# Patient Record
Sex: FEMALE | Race: WHITE | URBAN - METROPOLITAN AREA
[De-identification: names, ages, dates, MRNs, and addresses within clinical notes are randomized per-mention and may not be internally consistent; named-entity substitution may affect disease eponyms.]

---

## 2021-07-07 ENCOUNTER — OFFICE VISIT (OUTPATIENT)
Dept: URBAN - METROPOLITAN AREA CLINIC 18 | Facility: CLINIC | Age: 71
End: 2021-07-07
Payer: MEDICARE

## 2021-07-07 DIAGNOSIS — H52.4 PRESBYOPIA: ICD-10-CM

## 2021-07-07 DIAGNOSIS — H25.13 AGE-RELATED NUCLEAR CATARACT, BILATERAL: ICD-10-CM

## 2021-07-07 DIAGNOSIS — E11.9 DIABETES MELLITUS TYPE 2 WITHOUT MENTION OF COMPLICATION: Primary | ICD-10-CM

## 2021-07-07 PROCEDURE — 99203 OFFICE O/P NEW LOW 30 MIN: CPT | Performed by: OPTOMETRIST

## 2021-07-07 ASSESSMENT — VISUAL ACUITY
OS: 20/30
OD: 20/30

## 2021-07-07 ASSESSMENT — INTRAOCULAR PRESSURE
OS: 9
OD: 11

## 2021-07-07 NOTE — IMPRESSION/PLAN
Impression: Age-related nuclear cataract, bilateral: H25.13. Plan: Cataracts account for the patient's complaints. Patient education was discussed regarding cataracts.

## 2021-07-07 NOTE — IMPRESSION/PLAN
Impression: Presbyopia: H52.4. Plan: Finalized new glasses rx. Patient education on appropriate options of eye glasses. return to clinic in one year for complete eye exam and refraction.

## 2022-07-08 ENCOUNTER — OFFICE VISIT (OUTPATIENT)
Dept: URBAN - METROPOLITAN AREA CLINIC 18 | Facility: CLINIC | Age: 72
End: 2022-07-08
Payer: MEDICARE

## 2022-07-08 DIAGNOSIS — H52.4 PRESBYOPIA: ICD-10-CM

## 2022-07-08 DIAGNOSIS — E11.9 DIABETES MELLITUS TYPE 2 WITHOUT MENTION OF COMPLICATION: Primary | ICD-10-CM

## 2022-07-08 DIAGNOSIS — H25.13 AGE-RELATED NUCLEAR CATARACT, BILATERAL: ICD-10-CM

## 2022-07-08 PROCEDURE — 99213 OFFICE O/P EST LOW 20 MIN: CPT | Performed by: OPTOMETRIST

## 2022-07-08 ASSESSMENT — VISUAL ACUITY
OD: 20/25
OS: 20/20

## 2022-07-08 ASSESSMENT — INTRAOCULAR PRESSURE
OD: 11
OS: 13

## 2022-07-08 NOTE — IMPRESSION/PLAN
Impression: Presbyopia: H52.4. Plan: Finalized [employer]. Patient education on appropriate options of eye glasses. Return to clinic in one year for complete eye exam and refraction.

## 2022-07-08 NOTE — IMPRESSION/PLAN
Impression: Diabetes mellitus Type 2 without mention of complication: S45.7. Plan: Diabetes type II: no background retinopathy, no signs of neovascularization noted. Discussed ocular and systemic benefits of blood sugar control.  Return to clinic with Dr. Marlowe Paget in one year for a diabetic eye exam.

## 2023-07-10 ENCOUNTER — OFFICE VISIT (OUTPATIENT)
Dept: URBAN - METROPOLITAN AREA CLINIC 18 | Facility: CLINIC | Age: 73
End: 2023-07-10
Payer: MEDICARE

## 2023-07-10 DIAGNOSIS — E11.9 TYPE 2 DIABETES MELLITUS W/O COMPLICATION: Primary | ICD-10-CM

## 2023-07-10 DIAGNOSIS — H43.393 OTHER VITREOUS OPACITIES, BILATERAL: ICD-10-CM

## 2023-07-10 DIAGNOSIS — H25.13 AGE-RELATED NUCLEAR CATARACT, BILATERAL: ICD-10-CM

## 2023-07-10 DIAGNOSIS — H52.4 PRESBYOPIA: ICD-10-CM

## 2023-07-10 PROCEDURE — 99214 OFFICE O/P EST MOD 30 MIN: CPT | Performed by: OPTOMETRIST

## 2023-07-10 ASSESSMENT — VISUAL ACUITY
OD: 20/20
OS: 20/25-

## 2023-07-10 ASSESSMENT — INTRAOCULAR PRESSURE
OS: 9
OD: 11

## 2023-07-10 NOTE — IMPRESSION/PLAN
Impression: Other vitreous opacities, bilateral: H43.393. Plan:  There is no evidence of retinal pathology. All signs and risks of retinal detachment and tears were discussed in detail. Patient instructed to call the office immediately if flashes and floaters increase dramatically.

## 2023-07-10 NOTE — IMPRESSION/PLAN
Impression: Type 2 diabetes mellitus w/o complication: W18.9. Bilateral. Plan: Diabetes type II: no background retinopathy, no signs of neovascularization noted. Discussed ocular and systemic benefits of blood sugar control.  Return to clinic with Dr. Lan Clemens in one year for a diabetic eye exam.

## 2024-07-10 ENCOUNTER — OFFICE VISIT (OUTPATIENT)
Dept: URBAN - METROPOLITAN AREA CLINIC 18 | Facility: CLINIC | Age: 74
End: 2024-07-10
Payer: MEDICARE

## 2024-07-10 DIAGNOSIS — H25.13 AGE-RELATED NUCLEAR CATARACT, BILATERAL: ICD-10-CM

## 2024-07-10 DIAGNOSIS — H52.4 PRESBYOPIA: ICD-10-CM

## 2024-07-10 DIAGNOSIS — E11.9 DIABETES MELLITUS TYPE 2 WITHOUT MENTION OF COMPLICATION: Primary | ICD-10-CM

## 2024-07-10 PROCEDURE — 99213 OFFICE O/P EST LOW 20 MIN: CPT

## 2024-07-10 ASSESSMENT — INTRAOCULAR PRESSURE
OS: 10
OD: 11

## 2024-07-10 ASSESSMENT — VISUAL ACUITY
OD: 20/20
OS: 20/25

## 2025-07-15 ENCOUNTER — OFFICE VISIT (OUTPATIENT)
Dept: URBAN - METROPOLITAN AREA CLINIC 18 | Facility: CLINIC | Age: 75
End: 2025-07-15
Payer: MEDICARE

## 2025-07-15 DIAGNOSIS — H25.13 AGE-RELATED NUCLEAR CATARACT, BILATERAL: ICD-10-CM

## 2025-07-15 DIAGNOSIS — E11.9 TYPE 2 DIABETES MELLITUS W/O COMPLICATION: Primary | ICD-10-CM

## 2025-07-15 PROCEDURE — 99213 OFFICE O/P EST LOW 20 MIN: CPT

## 2025-07-15 ASSESSMENT — INTRAOCULAR PRESSURE
OD: 13
OS: 11